# Patient Record
Sex: MALE | Race: WHITE | NOT HISPANIC OR LATINO | Employment: OTHER | URBAN - METROPOLITAN AREA
[De-identification: names, ages, dates, MRNs, and addresses within clinical notes are randomized per-mention and may not be internally consistent; named-entity substitution may affect disease eponyms.]

---

## 2017-01-05 ENCOUNTER — LAB (OUTPATIENT)
Dept: LAB | Facility: HOSPITAL | Age: 62
End: 2017-01-05

## 2017-01-05 ENCOUNTER — APPOINTMENT (OUTPATIENT)
Dept: ONCOLOGY | Facility: HOSPITAL | Age: 62
End: 2017-01-05

## 2017-01-05 DIAGNOSIS — D75.1 POLYCYTHEMIA: ICD-10-CM

## 2017-01-05 LAB
BASOPHILS # BLD AUTO: 0.16 10*3/MM3 (ref 0–0.1)
BASOPHILS NFR BLD AUTO: 1.2 % (ref 0–1.1)
DEPRECATED RDW RBC AUTO: 45.4 FL (ref 37–49)
EOSINOPHIL # BLD AUTO: 0.37 10*3/MM3 (ref 0–0.36)
EOSINOPHIL NFR BLD AUTO: 2.8 % (ref 1–5)
ERYTHROCYTE [DISTWIDTH] IN BLOOD BY AUTOMATED COUNT: 15.2 % (ref 11.7–14.5)
HCT VFR BLD AUTO: 54.1 % (ref 40–49)
HGB BLD-MCNC: 18.8 G/DL (ref 13.5–16.5)
IMM GRANULOCYTES # BLD: 0.21 10*3/MM3 (ref 0–0.03)
IMM GRANULOCYTES NFR BLD: 1.6 % (ref 0–0.5)
LYMPHOCYTES # BLD AUTO: 1.88 10*3/MM3 (ref 1–3.5)
LYMPHOCYTES NFR BLD AUTO: 14.3 % (ref 20–49)
MCH RBC QN AUTO: 30.7 PG (ref 27–33)
MCHC RBC AUTO-ENTMCNC: 34.8 G/DL (ref 32–35)
MCV RBC AUTO: 88.4 FL (ref 83–97)
MONOCYTES # BLD AUTO: 0.88 10*3/MM3 (ref 0.25–0.8)
MONOCYTES NFR BLD AUTO: 6.7 % (ref 4–12)
NEUTROPHILS # BLD AUTO: 9.64 10*3/MM3 (ref 1.5–7)
NEUTROPHILS NFR BLD AUTO: 73.4 % (ref 39–75)
NRBC BLD MANUAL-RTO: 0.2 /100 WBC (ref 0–0)
PLATELET # BLD AUTO: 612 10*3/MM3 (ref 150–375)
PMV BLD AUTO: 10.5 FL (ref 8.9–12.1)
RBC # BLD AUTO: 6.12 10*6/MM3 (ref 4.3–5.5)
WBC NRBC COR # BLD: 13.14 10*3/MM3 (ref 4–10)

## 2017-01-05 PROCEDURE — 36416 COLLJ CAPILLARY BLOOD SPEC: CPT | Performed by: INTERNAL MEDICINE

## 2017-01-05 PROCEDURE — 85025 COMPLETE CBC W/AUTO DIFF WBC: CPT | Performed by: INTERNAL MEDICINE

## 2017-01-06 ENCOUNTER — INFUSION (OUTPATIENT)
Dept: ONCOLOGY | Facility: HOSPITAL | Age: 62
End: 2017-01-06

## 2017-01-06 VITALS
SYSTOLIC BLOOD PRESSURE: 119 MMHG | DIASTOLIC BLOOD PRESSURE: 79 MMHG | HEART RATE: 89 BPM | WEIGHT: 192 LBS | TEMPERATURE: 98 F | BODY MASS INDEX: 27.49 KG/M2

## 2017-01-06 DIAGNOSIS — D75.1 POLYCYTHEMIA: Primary | ICD-10-CM

## 2017-01-06 PROCEDURE — 99195 PHLEBOTOMY: CPT

## 2017-01-06 RX ORDER — SODIUM CHLORIDE 9 MG/ML
250 INJECTION, SOLUTION INTRAVENOUS ONCE
Status: CANCELLED | OUTPATIENT
Start: 2017-01-06

## 2017-01-11 ENCOUNTER — OFFICE VISIT (OUTPATIENT)
Dept: SPORTS MEDICINE | Facility: CLINIC | Age: 62
End: 2017-01-11

## 2017-01-11 VITALS
BODY MASS INDEX: 26.92 KG/M2 | DIASTOLIC BLOOD PRESSURE: 76 MMHG | WEIGHT: 188 LBS | HEART RATE: 68 BPM | OXYGEN SATURATION: 98 % | RESPIRATION RATE: 16 BRPM | HEIGHT: 70 IN | SYSTOLIC BLOOD PRESSURE: 142 MMHG

## 2017-01-11 DIAGNOSIS — M25.512 ACUTE PAIN OF LEFT SHOULDER: Primary | ICD-10-CM

## 2017-01-11 DIAGNOSIS — D75.1 POLYCYTHEMIA: Primary | ICD-10-CM

## 2017-01-11 DIAGNOSIS — M70.22 OLECRANON BURSITIS OF LEFT ELBOW: ICD-10-CM

## 2017-01-11 PROCEDURE — 99213 OFFICE O/P EST LOW 20 MIN: CPT | Performed by: FAMILY MEDICINE

## 2017-01-11 NOTE — MR AVS SNAPSHOT
Mahendra STERLING Bock   1/11/2017 8:40 AM   Office Visit    Dept Phone:  [phone]   Encounter #:  [id number]    Provider:  Devon Caballero MD   Department:  [facility]                Your Full Care Plan              Your Updated Medication List          This list is accurate as of: 1/11/17  9:43 AM.  Always use your most recent med list.                ALPRAZolam 0.5 MG tablet   Commonly known as:  XANAX   TAKE 2 TABLETS BY MOUTH TWICE DAILY       amLODIPine 10 MG tablet   Commonly known as:  NORVASC   TAKE 1 TABLET BY MOUTH EVERY DAY       fish oil 1000 MG capsule capsule       FLUVIRIN suspension injection   Generic drug:  Influenza Vac Typ A&B Surf Ant       lisinopril 40 MG tablet   Commonly known as:  PRINIVIL,ZESTRIL   TAKE 1 TABLET BY MOUTH EVERY DAY       MULTIVITAMINS PO       PREVNAR 13 vaccine   Generic drug:  pneumococcal conj. 13-valent       temazepam 30 MG capsule   Commonly known as:  RESTORIL   TAKE 1 CAPSULE BY MOUTH EVERY DAY AT BEDTIME AS NEEDED FOR SLEEP               You Were Diagnosed With        Codes Comments    Acute pain of left shoulder    -  Primary ICD-10-CM: M25.512  ICD-9-CM: 719.41     Olecranon bursitis of left elbow     ICD-10-CM: M70.22  ICD-9-CM: 726.33       Instructions     None    Patient Instructions History      Upcoming Appointments     Visit Type Date Time Department    ESTABLISHED PT - SPORTS MED 1/11/2017  8:40 AM [facility]OR[facility]    FOLLOW UP 1 UNIT 1/12/2017  4:40 PM [facility] ONC CBC [facility]    LAB 1/12/2017  4:00 PM  [facility] CBC LAB [facility]      MyChart Signup     Our records indicate that you have declined [facility] MyChart signup. If you would like to sign up for Allegory Lawhart, please email [email or web address] or call [phone] to obtain an activation code.             Other Info from Your Visit           Your Appointments     Jan 12, 2017  4:00 PM EST   Lab with LAB CHAIR 2 KENN MCCOY    "  [facility] ONC LAB (--)    [address]  [address]  [facility] [id number] [phone]            Jan 12, 2017  4:40 PM EST   FOLLOW UP with Lalitha Simmons MD   [facility] IN BLOOD DISORDERS AND CANCER ([facility])    [address]  [address]  [place] [address] [phone]              Allergies     No Known Allergies      Reason for Visit     Left Elbow - Follow-up Would like elbow to be drained.    Left Shoulder - Pain Hs been painful since 11/17/16.       Vital Signs     Blood Pressure Pulse Respirations Height Weight Oxygen Saturation    142/76 68 16 70\" (177.8 cm) 188 lb (85.3 kg) 98%    Body Mass Index Smoking Status                26.98 kg/m2 Current Every Day Smoker          Problems and Diagnoses Noted     Acute pain of left shoulder    -  Primary    Olecranon bursitis of left elbow            "

## 2017-01-11 NOTE — PROGRESS NOTES
"Mahendra is a 61 y.o. year old male    Chief Complaint   Patient presents with   • Left Elbow - Follow-up     Would like elbow to be drained.   • Left Shoulder - Pain     Hs been painful since 11/17/16.        History of Present Illness  1. Elbow still giving issues. Draining some fluid now on and off.  2. L shoulder pain since his injury in November. Initially thought it was just bruised, but although it is slowly improving the pain has not fully resolved. Aching pain, mild-moderate severity but worse with activity, deep in the joint. No weakness.     I have reviewed the patient's medical history in detail and updated the computerized patient record.    Review of Systems   Constitutional: Negative.    Musculoskeletal: Positive for arthralgias.   Neurological: Negative.        Visit Vitals   • /76   • Pulse 68   • Resp 16   • Ht 70\" (177.8 cm)   • Wt 188 lb (85.3 kg)   • SpO2 98%   • BMI 26.98 kg/m2        Physical Exam    Vital signs reviewed.   General: No acute distress.  Eyes: conjunctiva clear; pupils equally round and reactive  ENT: external ears and nose atraumatic; oropharynx clear  CV: no peripheral edema, 2+ distal pulses  Resp: normal respiratory effort, no use of accessory muscles  Skin: no rashes or wounds; normal turgor  Psych: mood and affect appropriate; recent and remote memory intact  Neuro: sensation to light touch intact    MSK Exam:  L elbow - large thickened olecranon bursa with nodularity within    L shoulder: Normal appearance. No ttp. Normal ROM. Positive Saleem. Equivocal neer and empty can. Normal rotator cuff strength. Neg dee and yergason. No a/p laxity.     Diagnoses and all orders for this visit:    Acute pain of left shoulder    Olecranon bursitis of left elbow  -     Ambulatory Referral to Hand Surgery  Shoulder pain is hopefully just some deep joint contusion, could possibly be a labral tear. Discussed nsaids and gentle strengthening. If not improving gradually will pursue " imaging.

## 2017-01-12 ENCOUNTER — LAB (OUTPATIENT)
Dept: ONCOLOGY | Facility: HOSPITAL | Age: 62
End: 2017-01-12

## 2017-01-12 ENCOUNTER — OFFICE VISIT (OUTPATIENT)
Dept: ONCOLOGY | Facility: CLINIC | Age: 62
End: 2017-01-12

## 2017-01-12 VITALS
BODY MASS INDEX: 26.97 KG/M2 | TEMPERATURE: 98 F | HEART RATE: 88 BPM | SYSTOLIC BLOOD PRESSURE: 116 MMHG | HEIGHT: 70 IN | DIASTOLIC BLOOD PRESSURE: 72 MMHG | WEIGHT: 188.4 LBS | RESPIRATION RATE: 16 BRPM | OXYGEN SATURATION: 96 %

## 2017-01-12 DIAGNOSIS — D45 POLYCYTHEMIA VERA (HCC): ICD-10-CM

## 2017-01-12 DIAGNOSIS — D75.1 POLYCYTHEMIA: ICD-10-CM

## 2017-01-12 DIAGNOSIS — D45: Primary | ICD-10-CM

## 2017-01-12 LAB
BASOPHILS # BLD AUTO: 0.12 10*3/MM3 (ref 0–0.2)
BASOPHILS NFR BLD AUTO: 1 % (ref 0–1.5)
DEPRECATED RDW RBC AUTO: 46.8 FL (ref 37–54)
EOSINOPHIL # BLD AUTO: 0.24 10*3/MM3 (ref 0–0.7)
EOSINOPHIL NFR BLD AUTO: 1.9 % (ref 0.3–6.2)
ERYTHROCYTE [DISTWIDTH] IN BLOOD BY AUTOMATED COUNT: 14.4 % (ref 11.5–14.5)
HCT VFR BLD AUTO: 49.5 % (ref 40.4–52.2)
HGB BLD-MCNC: 17.3 G/DL (ref 13.7–17.6)
IMM GRANULOCYTES # BLD: 0.14 10*3/MM3 (ref 0–0.03)
IMM GRANULOCYTES NFR BLD: 1.1 % (ref 0–0.5)
LYMPHOCYTES # BLD AUTO: 1.77 10*3/MM3 (ref 0.9–4.8)
LYMPHOCYTES NFR BLD AUTO: 14.2 % (ref 19.6–45.3)
MCH RBC QN AUTO: 31.3 PG (ref 27–32.7)
MCHC RBC AUTO-ENTMCNC: 34.9 G/DL (ref 32.6–36.4)
MCV RBC AUTO: 89.7 FL (ref 79.8–96.2)
MONOCYTES # BLD AUTO: 0.76 10*3/MM3 (ref 0.2–1.2)
MONOCYTES NFR BLD AUTO: 6.1 % (ref 5–12)
NEUTROPHILS # BLD AUTO: 9.41 10*3/MM3 (ref 1.9–8.1)
NEUTROPHILS NFR BLD AUTO: 75.7 % (ref 42.7–76)
NRBC BLD MANUAL-RTO: 0.2 /100 WBC (ref 0–0)
PLATELET # BLD AUTO: 625 10*3/MM3 (ref 140–500)
PMV BLD AUTO: 10.6 FL (ref 6–12)
RBC # BLD AUTO: 5.52 10*6/MM3 (ref 4.6–6)
WBC NRBC COR # BLD: 12.44 10*3/MM3 (ref 4.5–10.7)

## 2017-01-12 PROCEDURE — 99214 OFFICE O/P EST MOD 30 MIN: CPT | Performed by: INTERNAL MEDICINE

## 2017-01-12 PROCEDURE — 36415 COLL VENOUS BLD VENIPUNCTURE: CPT

## 2017-01-12 PROCEDURE — 85025 COMPLETE CBC W/AUTO DIFF WBC: CPT | Performed by: INTERNAL MEDICINE

## 2017-01-12 RX ORDER — HYDROXYUREA 500 MG/1
500 CAPSULE ORAL 2 TIMES DAILY
Qty: 60 CAPSULE | Refills: 5 | Status: SHIPPED | OUTPATIENT
Start: 2017-01-12 | End: 2017-08-24 | Stop reason: SDUPTHER

## 2017-01-14 PROBLEM — D45 POLYCYTHEMIA VERA (HCC): Status: ACTIVE | Noted: 2017-01-14

## 2017-01-14 NOTE — PROGRESS NOTES
Subjective .   DOS: 1/12/17      REASONS FOR FOLLOWUP:  Polycythemia vera, JODI 2 positive, erythropoietin level is low 1.0.    HISTORY OF PRESENT ILLNESS:  The patient is a 61 y.o. year old male who is here for follow-up with the above-mentioned history.    History of Present Illness   Pt presented with Polycythemia with leukocytosis and intermittent thrombocytosis currently referred here for evaluation. He is a big smoker smoking 2 packs per day for many years. He also has symptoms of sleep apnea with significant snoring at bedtime and daytime sleepiness. And fatigue. Complete work up in hematological history. Pt has p. Vera s/p 2 phlebotomies. No itching, no headache or weakness.    Past Medical History   Diagnosis Date   • Arthritis    • ED (erectile dysfunction)    • Hypertension    • Pyloric stenosis    • Venereal disease        HEMATOLOGIC  HISTORY:    patient is a 61-year-old gentleman who has been followed by Dr. Devon rocha for the last 4-5 years and states that he has had elevated hematocrit for quite a while. Several years ago he was found to have high hematocrit and more recently when he went to Dr. Caballero's office his hematocrit had gone to 59. He does smoke 2 packs per day. He had started at age 18 and quit at age 39s mid-forties but because of her recent divorce he recently started back on smoking since last year. He also drinks heavily and he has had heavy alcohol use for quite some time. He says he takes 2-3 beers a day along with 2-3 cocktails daily. He states that he has got chronic epigastric and right upper quadrant pain that he is concerned about as it doesn't seem to have improved it has gotten a little worse lately in the with pain in the right upper quadrant and epigastrium. He has not lost weight.     He stays a month ago he fell and got hurt by the Ondina on his back and his left shoulder. So he has a lot of pain in the shoulder as well as his back. He did go to the emergency room the  day after at which time x-rays have been done and all of the x-rays were negative for any fractures.     He states he does not have any headaches, he does have significant snoring and daytime fatigue and sleepiness but has not been so far evaluated for sleep apnea.    JAK2 positive, MPL, CALR, BCR/CABL negative. Flow negative. Erythropoietin level 1.0.   CT  c/abd/pelvis negative.  Hydrea started 17    Current Outpatient Prescriptions on File Prior to Visit   Medication Sig Dispense Refill   • ALPRAZolam (XANAX) 0.5 MG tablet TAKE 2 TABLETS BY MOUTH TWICE DAILY 60 tablet 0   • amLODIPine (NORVASC) 10 MG tablet TAKE 1 TABLET BY MOUTH EVERY DAY 90 tablet 1   • FLUVIRIN suspension injection ADM 0.5ML IM UTD  0   • lisinopril (PRINIVIL,ZESTRIL) 40 MG tablet TAKE 1 TABLET BY MOUTH EVERY DAY 30 tablet 2   • Multiple Vitamin (MULTIVITAMINS PO) Take  by mouth.     • Omega-3 Fatty Acids (FISH OIL) 1000 MG capsule capsule Take  by mouth Daily With Breakfast.     • PREVNAR 13 vaccine ADM 0.5ML IM UTD  0   • temazepam (RESTORIL) 30 MG capsule TAKE 1 CAPSULE BY MOUTH EVERY DAY AT BEDTIME AS NEEDED FOR SLEEP 90 capsule 0     No current facility-administered medications on file prior to visit.        ALLERGIES:   No Known Allergies     Social history he is been  for the fourth time. He is a self-employed . He smokes 2 packs per day, is to smoke many years until age 39 and now more recently he restarted back a 1 year ago because of the recent divorce. He drinks heavily as well.      Family history his mother had breast cancer at age 60 but she is alive at age 84. His father  at 84 with car accident. He has 2 brothers and 2 sisters that are all in good health. He has got 4 kids [phone] and 23 Katy who are all in good health.            Cancer-related family history includes Breast cancer in his mother.     Review of Systems  A comprehensive 14 point review of systems was performed and was negative except  "as mentioned.    Objective      Vitals:    01/12/17 1642   BP: 116/72   Pulse: 88   Resp: 16   Temp: 98 °F (36.7 °C)   TempSrc: Oral   SpO2: 96%   Weight: 188 lb 6.4 oz (85.5 kg)   Height: 70\" (177.8 cm)   PainSc: 0-No pain     Current Status 1/12/2017   ECOG score 0       Physical Exam    GENERAL:  Well-developed, well-nourished in no acute distress.   SKIN:  Warm, dry without rashes, purpura or petechiae.  EYES:  Pupils equal, round and reactive to light.  EOMs intact.  Conjunctivae normal.  EARS:  Hearing intact.  NOSE:  Septum midline.  No excoriations or nasal discharge.  MOUTH:  Tongue is well-papillated; no stomatitis or ulcers.  Lips normal.  THROAT:  Oropharynx without lesions or exudates.  NECK:  Supple with good range of motion; no thyromegaly or masses, no JVD.  LYMPHATICS:  No cervical, supraclavicular, axillary or inguinal adenopathy.  CHEST:  Lungs clear to auscultation. Good airflow.  CARDIAC:  Regular rate and rhythm without murmurs, rubs or gallops. Normal S1,S2.  ABDOMEN:  Soft, nontender with no hepatosplenomegaly or masses.  EXTREMITIES:  No clubbing, cyanosis or edema.  NEUROLOGICAL:  Cranial Nerves II-XII grossly intact.  No focal neurological deficits.  PSYCHIATRIC:  Normal affect and mood.        RECENT LABS:  Hematogy WBC   Date Value Ref Range Status   01/12/2017 12.44 (H) 4.50 - 10.70 10*3/mm3 Final   12/01/2016 12.34 (H) 4.50 - 10.70 10*3/mm3 Final     RBC   Date Value Ref Range Status   01/12/2017 5.52 4.60 - 6.00 10*6/mm3 Final   12/01/2016 6.32 (H) 4.60 - 6.00 10*6/mm3 Final     HEMOGLOBIN   Date Value Ref Range Status   01/12/2017 17.3 13.7 - 17.6 g/dL Final   12/01/2016 19.6 (H) 13.7 - 17.6 g/dL Final     HEMATOCRIT   Date Value Ref Range Status   01/12/2017 49.5 40.4 - 52.2 % Final   12/01/2016 60.1 (H) 40.4 - 52.2 % Final     PLATELETS   Date Value Ref Range Status   01/12/2017 625 (H) 140 - 500 10*3/mm3 Final   12/01/2016 370 140 - 500 10*3/mm3 Final        Assessment/Plan     1. " Polycythemia vera, JODI 2 positive, with decreased erythropoietn and splenomegaly. S/p phlebotomy x 2 with hg decresed  to 17.3.will start hydroxyurea. Explained all side effects of drug and he is willing to start. Will start aspirin and hydrea 500 mgs po bid.  2. Etoh abuse  3, h/o smoking     Plan:    1. Weekly cbc with nurse review    2. np in 2 weeks, add MDin 4 weeks.     3. Waldo ambrocio in 8 weeks    4. Hydrea 500 mg po bid    Lalitha Ambrocio MD              Cc:  Devon Caballero MD

## 2017-01-18 ENCOUNTER — TELEPHONE (OUTPATIENT)
Dept: SPORTS MEDICINE | Facility: CLINIC | Age: 62
End: 2017-01-18

## 2017-01-18 DIAGNOSIS — D45 POLYCYTHEMIA VERA (HCC): Primary | ICD-10-CM

## 2017-01-18 NOTE — TELEPHONE ENCOUNTER
Please change his appt to be with Dr. Weber instead of [facility] and [facility] if possible (I heard that [facility] doesn't take his insurance). Ok to change to ortho referral instead of hand.

## 2017-01-19 ENCOUNTER — APPOINTMENT (OUTPATIENT)
Dept: ONCOLOGY | Facility: CLINIC | Age: 62
End: 2017-01-19

## 2017-01-19 ENCOUNTER — APPOINTMENT (OUTPATIENT)
Dept: ONCOLOGY | Facility: HOSPITAL | Age: 62
End: 2017-01-19

## 2017-01-19 DIAGNOSIS — M25.522 LEFT ELBOW PAIN: Primary | ICD-10-CM

## 2017-01-20 ENCOUNTER — INFUSION (OUTPATIENT)
Dept: ONCOLOGY | Facility: HOSPITAL | Age: 62
End: 2017-01-20

## 2017-01-20 ENCOUNTER — OFFICE VISIT (OUTPATIENT)
Dept: ONCOLOGY | Facility: CLINIC | Age: 62
End: 2017-01-20

## 2017-01-20 ENCOUNTER — LAB (OUTPATIENT)
Dept: ONCOLOGY | Facility: HOSPITAL | Age: 62
End: 2017-01-20

## 2017-01-20 VITALS
OXYGEN SATURATION: 96 % | WEIGHT: 188.4 LBS | HEIGHT: 70 IN | HEART RATE: 73 BPM | RESPIRATION RATE: 16 BRPM | DIASTOLIC BLOOD PRESSURE: 69 MMHG | TEMPERATURE: 97.7 F | SYSTOLIC BLOOD PRESSURE: 104 MMHG | BODY MASS INDEX: 26.97 KG/M2

## 2017-01-20 DIAGNOSIS — D45 POLYCYTHEMIA VERA (HCC): Primary | ICD-10-CM

## 2017-01-20 DIAGNOSIS — D45 POLYCYTHEMIA VERA (HCC): ICD-10-CM

## 2017-01-20 LAB
BASOPHILS # BLD AUTO: 0.12 10*3/MM3 (ref 0–0.2)
BASOPHILS NFR BLD AUTO: 1.1 % (ref 0–1.5)
DEPRECATED RDW RBC AUTO: 48 FL (ref 37–54)
EOSINOPHIL # BLD AUTO: 0.26 10*3/MM3 (ref 0–0.7)
EOSINOPHIL NFR BLD AUTO: 2.4 % (ref 0.3–6.2)
ERYTHROCYTE [DISTWIDTH] IN BLOOD BY AUTOMATED COUNT: 14.6 % (ref 11.5–14.5)
HCT VFR BLD AUTO: 50.3 % (ref 40.4–52.2)
HGB BLD-MCNC: 17.6 G/DL (ref 13.7–17.6)
IMM GRANULOCYTES # BLD: 0.13 10*3/MM3 (ref 0–0.03)
IMM GRANULOCYTES NFR BLD: 1.2 % (ref 0–0.5)
LYMPHOCYTES # BLD AUTO: 1.69 10*3/MM3 (ref 0.9–4.8)
LYMPHOCYTES NFR BLD AUTO: 15.5 % (ref 19.6–45.3)
MCH RBC QN AUTO: 31.8 PG (ref 27–32.7)
MCHC RBC AUTO-ENTMCNC: 35 G/DL (ref 32.6–36.4)
MCV RBC AUTO: 91 FL (ref 79.8–96.2)
MONOCYTES # BLD AUTO: 0.65 10*3/MM3 (ref 0.2–1.2)
MONOCYTES NFR BLD AUTO: 6 % (ref 5–12)
NEUTROPHILS # BLD AUTO: 8.02 10*3/MM3 (ref 1.9–8.1)
NEUTROPHILS NFR BLD AUTO: 73.8 % (ref 42.7–76)
NRBC BLD MANUAL-RTO: 0 /100 WBC (ref 0–0)
PLATELET # BLD AUTO: 485 10*3/MM3 (ref 140–500)
PMV BLD AUTO: 10.8 FL (ref 6–12)
RBC # BLD AUTO: 5.53 10*6/MM3 (ref 4.6–6)
WBC NRBC COR # BLD: 10.87 10*3/MM3 (ref 4.5–10.7)

## 2017-01-20 PROCEDURE — 99213 OFFICE O/P EST LOW 20 MIN: CPT | Performed by: NURSE PRACTITIONER

## 2017-01-20 PROCEDURE — 99195 PHLEBOTOMY: CPT

## 2017-01-20 PROCEDURE — 85025 COMPLETE CBC W/AUTO DIFF WBC: CPT | Performed by: INTERNAL MEDICINE

## 2017-01-20 PROCEDURE — 36415 COLL VENOUS BLD VENIPUNCTURE: CPT

## 2017-01-20 RX ORDER — SODIUM CHLORIDE 9 MG/ML
250 INJECTION, SOLUTION INTRAVENOUS ONCE
Status: CANCELLED | OUTPATIENT
Start: 2017-01-20

## 2017-01-20 NOTE — PROGRESS NOTES
Subjective .   REASONS FOR FOLLOWUP:  Polycythemia vera, JODI 2 positive, erythropoietin level is low 1.0.    HISTORY OF PRESENT ILLNESS:  The patient is a 61 y.o. year old male who is here for follow-up with the above-mentioned history.    History of Present Illness      The patient is a pleasant 61-year-old gentleman with the above-mentioned history, who returns today for lab review and toxicity check having initiated Hydrea 500 mg twice a day, 1 week ago.  Thankfully, the patient's thrombocytosis is improved.  Unfortunately, his tolerance to Hydrea has been less than ideal.  He reports significant increase in fatigue.  He also reports feeling of agitation and disorientation in the morning after taking his morning  Hydrea.  He is able to tolerate his nighttime dose, as he sleeps through the majority of side effects.  He denies any headaches or blurred vision.  He denies any lower extremity edema.  The patient is questioning discontinuing Hydrea altogether, and proceeding with phlebotomy alone.  He is pleased to know his counts have improved after only one week of Hydrea.    Past Medical History   Diagnosis Date   • Arthritis    • ED (erectile dysfunction)    • Hypertension    • Pyloric stenosis    • Venereal disease      Past Surgical History   Procedure Laterality Date   • Colonoscopy     • Foot arthroplasty Right 1990   • Rotator cuff repair Left    • Sinus surgery         HEMATOLOGIC  HISTORY:    patient is a 61-year-old gentleman who has been followed by Dr. Devon rocha for the last 4-5 years and states that he has had elevated hematocrit for quite a while. Several years ago he was found to have high hematocrit and more recently when he went to Dr. Caballero's office his hematocrit had gone to 59. He does smoke 2 packs per day. He had started at age 18 and quit at age 39s mid-forties but because of her recent divorce he recently started back on smoking since last year. He also drinks heavily and he has had  heavy alcohol use for quite some time. He says he takes 2-3 beers a day along with 2-3 cocktails daily. He states that he has got chronic epigastric and right upper quadrant pain that he is concerned about as it doesn't seem to have improved it has gotten a little worse lately in the with pain in the right upper quadrant and epigastrium. He has not lost weight.     He stays a month ago he fell and got hurt by the Ondina on his back and his left shoulder. So he has a lot of pain in the shoulder as well as his back. He did go to the emergency room the day after at which time x-rays have been done and all of the x-rays were negative for any fractures.     He states he does not have any headaches, he does have significant snoring and daytime fatigue and sleepiness but has not been so far evaluated for sleep apnea.    JAK2 positive, MPL, CALR, BCR/CABL negative. Flow negative. Erythropoietin level 1.0.   CT  c/abd/pelvis negative.  Hydrea started 17    MEDICATIONS: The current medication list was reviewed in the EMR.    ALLERGIES:   No Known Allergies     Social history he is been  for the fourth time. He is a self-employed . He smokes 2 packs per day, is to smoke many years until age 39 and now more recently he restarted back a 1 year ago because of the recent divorce. He drinks heavily as well.      Family history his mother had breast cancer at age 60 but she is alive at age 84. His father  at 84 with car accident. He has 2 brothers and 2 sisters that are all in good health. He has got 4 kids [phone] and 23 Katy who are all in good health.        Cancer-related family history includes Breast cancer in his mother.     I have reviewed the patient's medical history in detail and updated the computerized patient record.    Review of Systems   Constitutional: Positive for fatigue. Negative for chills and fever.   HENT: Negative for mouth sores and sore throat.    Eyes: Negative for visual  "disturbance.   Respiratory: Negative for cough and shortness of breath.    Gastrointestinal: Negative for abdominal pain, diarrhea, nausea and vomiting.   Genitourinary: Negative for dysuria and frequency.   Skin: Positive for wound (left elbow.). Negative for pallor and rash.   Neurological: Negative for dizziness and weakness.   Hematological: Does not bruise/bleed easily.   Psychiatric/Behavioral: Positive for agitation. The patient is not nervous/anxious.    All other systems reviewed and are negative.    Objective      Vitals:    01/20/17 0953   BP: 104/69   Pulse: 73   Resp: 16   Temp: 97.7 °F (36.5 °C)   SpO2: 96%   Weight: 188 lb 6.4 oz (85.5 kg)   Height: 70.08\" (178 cm)   PainSc: 0-No pain     Current Status 1/20/2017   ECOG score 0       Physical Exam    GENERAL:  Well-developed, well-nourished in no acute distress.   SKIN:  Warm, dry without rashes, purpura or petechiae.  EYES:  Pupils equal, round and reactive to light.  EOMs intact.  Conjunctivae normal.  EARS:  Hearing intact.  NOSE:  Septum midline.  No excoriations or nasal discharge.  CHEST:  Lungs clear to auscultation. Good airflow.  CARDIAC:  Regular rate and rhythm without murmurs, rubs or gallops. Normal S1,S2.  ABDOMEN:  Soft, nontender with no hepatosplenomegaly or masses.  EXTREMITIES:  No clubbing, cyanosis or edema.  Edema, with serous drainage of the left elbow related to foreign object.  NEUROLOGICAL:  Cranial Nerves II-XII grossly intact.  No focal neurological deficits.   PSYCHIATRIC:  Normal affect and mood.        RECENT LABS:  Lab Results   Component Value Date    WBC 10.87 (H) 01/20/2017    HGB 17.6 01/20/2017    HCT 50.3 01/20/2017    MCV 91.0 01/20/2017     01/20/2017       Assessment/Plan   1. Polycythemia vera, JODI 2 positive, with decreased erythropoietn and splenomegaly. S/p phlebotomy x 2 with decreased hemoglobin. Initiation of Hydrea 500 mg twice a day 1/13/2017.    Decreased to 500 mg daily due to poor tolerance.  " Continue to phlebotomize for hematocrit greater than 45%.  2. Etoh abuse  3. h/o smoking     Plan:  1. Decrease Hydrea to 500mg daily.  2.  Proceed with phlebotomy today, 500 mL's.  The patient will continue to receive phlebotomy for hematocrit greater than 45%.  3.  Return weekly for CBC with nurse review  4.  Follow-up with Dr. Simmons 3/16/2017.    The plan to decrease Hydrea to 500 mg daily given the patient's tolerance, and phlebotomize for hematocrit greater than 45% was discussed and reviewed with Dr. Simmons who is in agreement.     Joanne Bray, APRN  01/20/2017      Cc:  Devon Caballero MD

## 2017-01-26 ENCOUNTER — INFUSION (OUTPATIENT)
Dept: ONCOLOGY | Facility: HOSPITAL | Age: 62
End: 2017-01-26

## 2017-01-26 ENCOUNTER — OFFICE VISIT (OUTPATIENT)
Dept: ONCOLOGY | Facility: CLINIC | Age: 62
End: 2017-01-26

## 2017-01-26 ENCOUNTER — TELEPHONE (OUTPATIENT)
Dept: SPORTS MEDICINE | Facility: CLINIC | Age: 62
End: 2017-01-26

## 2017-01-26 ENCOUNTER — LAB (OUTPATIENT)
Dept: ONCOLOGY | Facility: HOSPITAL | Age: 62
End: 2017-01-26

## 2017-01-26 VITALS
SYSTOLIC BLOOD PRESSURE: 119 MMHG | OXYGEN SATURATION: 98 % | TEMPERATURE: 97.6 F | DIASTOLIC BLOOD PRESSURE: 69 MMHG | HEART RATE: 72 BPM

## 2017-01-26 DIAGNOSIS — D45 POLYCYTHEMIA VERA (HCC): ICD-10-CM

## 2017-01-26 DIAGNOSIS — D45: ICD-10-CM

## 2017-01-26 DIAGNOSIS — F06.4 ANXIETY DISORDER DUE TO MEDICAL CONDITION: Primary | ICD-10-CM

## 2017-01-26 LAB
BASOPHILS # BLD AUTO: 0.12 10*3/MM3 (ref 0–0.2)
BASOPHILS NFR BLD AUTO: 1.2 % (ref 0–1.5)
DEPRECATED RDW RBC AUTO: 51.8 FL (ref 37–54)
EOSINOPHIL # BLD AUTO: 0.27 10*3/MM3 (ref 0–0.7)
EOSINOPHIL NFR BLD AUTO: 2.8 % (ref 0.3–6.2)
ERYTHROCYTE [DISTWIDTH] IN BLOOD BY AUTOMATED COUNT: 16.1 % (ref 11.5–14.5)
HCT VFR BLD AUTO: 49.7 % (ref 40.4–52.2)
HGB BLD-MCNC: 17.1 G/DL (ref 13.7–17.6)
IMM GRANULOCYTES # BLD: 0.16 10*3/MM3 (ref 0–0.03)
IMM GRANULOCYTES NFR BLD: 1.7 % (ref 0–0.5)
LYMPHOCYTES # BLD AUTO: 1.57 10*3/MM3 (ref 0.9–4.8)
LYMPHOCYTES NFR BLD AUTO: 16.3 % (ref 19.6–45.3)
MCH RBC QN AUTO: 31.8 PG (ref 27–32.7)
MCHC RBC AUTO-ENTMCNC: 34.4 G/DL (ref 32.6–36.4)
MCV RBC AUTO: 92.4 FL (ref 79.8–96.2)
MONOCYTES # BLD AUTO: 0.57 10*3/MM3 (ref 0.2–1.2)
MONOCYTES NFR BLD AUTO: 5.9 % (ref 5–12)
NEUTROPHILS # BLD AUTO: 6.94 10*3/MM3 (ref 1.9–8.1)
NEUTROPHILS NFR BLD AUTO: 72.1 % (ref 42.7–76)
NRBC BLD MANUAL-RTO: 0 /100 WBC (ref 0–0)
PLAT MORPH BLD: NORMAL
PLATELET # BLD AUTO: 378 10*3/MM3 (ref 140–500)
PMV BLD AUTO: 11.5 FL (ref 6–12)
RBC # BLD AUTO: 5.38 10*6/MM3 (ref 4.6–6)
RBC MORPH BLD: NORMAL
WBC MORPH BLD: NORMAL
WBC NRBC COR # BLD: 9.63 10*3/MM3 (ref 4.5–10.7)

## 2017-01-26 PROCEDURE — 85007 BL SMEAR W/DIFF WBC COUNT: CPT | Performed by: INTERNAL MEDICINE

## 2017-01-26 PROCEDURE — 85025 COMPLETE CBC W/AUTO DIFF WBC: CPT | Performed by: INTERNAL MEDICINE

## 2017-01-26 PROCEDURE — 99195 PHLEBOTOMY: CPT

## 2017-01-26 PROCEDURE — 36415 COLL VENOUS BLD VENIPUNCTURE: CPT

## 2017-01-26 PROCEDURE — 99212-NC PR NO CHARGE CBC OFFICE OUTPATIENT VISIT 10 MINUTES: Performed by: NURSE PRACTITIONER

## 2017-01-26 NOTE — PROGRESS NOTES
Reviewed labs with patient today. His platelets have improved. His HCT is 49 which qualifies him for phlebotomy today. His current parameters are phlebotomy for HCT above 45. He will continue Hydrea 500mg once daily. He is requesting a consult to Dr. Cristobal today as he is having difficulty processing his new diagnosis. I will order a referral today. He will return as already scheduled for lab work and possible phlebotomy next week.

## 2017-01-30 ENCOUNTER — DOCUMENTATION (OUTPATIENT)
Dept: ONCOLOGY | Facility: CLINIC | Age: 62
End: 2017-01-30

## 2017-01-30 RX ORDER — TEMAZEPAM 30 MG/1
CAPSULE ORAL
Qty: 90 CAPSULE | Refills: 0 | OUTPATIENT
Start: 2017-01-30

## 2017-01-30 NOTE — PROGRESS NOTES
Since pt is seen only at [facility], ISAI sent an email to Amaya Welch, suggesting she follow up with him. This is in response to a notice from Marni Oakley that Dr. Cristobal is only seeing pts who have been diagnosed with cancer.

## 2017-01-31 ENCOUNTER — DOCUMENTATION (OUTPATIENT)
Dept: ONCOLOGY | Facility: HOSPITAL | Age: 62
End: 2017-01-31

## 2017-01-31 DIAGNOSIS — D45 POLYCYTHEMIA VERA (HCC): Primary | ICD-10-CM

## 2017-01-31 NOTE — PROGRESS NOTES
"OSW was requested by Tootie Burk LCSW, to contact the pt to facilitate a mental health referral. OSW initiated a call to the pt who was at work. 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 The pt stated that he is aware that he \"needs to quit smoking and drinking\" due to his recent diagnosis of polycythemia vera. He states that he takes xanax in the morning to address his anxiety; however, he noted that he feels his coping skills are \"not good\".    The pt is currently  from his wife who lives locally. The pt relocated to [place] from [place] seven years ago. [facility] OSW reviewed with the pt his options for mental health care which are [facility] and [facility], and both require therapy visits prior to being scheduled with a qualified individual for a medication evaluation. The pt stated that he has seen a therapist in the past (who he felt was helpful), Jass Gamble, a psychologist, and was paying $120 out of pocket. The pt voiced interest in using his mental healthcare benefit and asked OSW to call [facility] on his behalf to refer him. OSW agreed and made the referral to Bushra at [phone]. Bushra stated that the pt would be contacted within 24-48 hours to arrange an intake appointment. Further, Bushra stated that there is a 2 1/2 month wait to see a provider who can prescribe. OSW called the pt to inform him of this plan; he voiced understanding.    OSW will send the pt information on smoking cessation via [employer] Mail.    Amaya Welch LCSW  Oncology Social Worker   "

## 2017-02-02 ENCOUNTER — DOCUMENTATION (OUTPATIENT)
Dept: ONCOLOGY | Facility: CLINIC | Age: 62
End: 2017-02-02

## 2017-02-02 ENCOUNTER — OFFICE VISIT (OUTPATIENT)
Dept: ONCOLOGY | Facility: CLINIC | Age: 62
End: 2017-02-02

## 2017-02-02 ENCOUNTER — LAB (OUTPATIENT)
Dept: ONCOLOGY | Facility: HOSPITAL | Age: 62
End: 2017-02-02

## 2017-02-02 VITALS
SYSTOLIC BLOOD PRESSURE: 113 MMHG | OXYGEN SATURATION: 99 % | HEART RATE: 82 BPM | TEMPERATURE: 97.9 F | DIASTOLIC BLOOD PRESSURE: 76 MMHG

## 2017-02-02 DIAGNOSIS — D45 POLYCYTHEMIA VERA (HCC): ICD-10-CM

## 2017-02-02 DIAGNOSIS — D45 POLYCYTHEMIA VERA (HCC): Primary | ICD-10-CM

## 2017-02-02 LAB
BASOPHILS # BLD AUTO: 0.1 10*3/MM3 (ref 0–0.2)
BASOPHILS NFR BLD AUTO: 1.3 % (ref 0–1.5)
DEPRECATED RDW RBC AUTO: 57.2 FL (ref 37–54)
EOSINOPHIL # BLD AUTO: 0.25 10*3/MM3 (ref 0–0.7)
EOSINOPHIL NFR BLD AUTO: 3.3 % (ref 0.3–6.2)
ERYTHROCYTE [DISTWIDTH] IN BLOOD BY AUTOMATED COUNT: 16.7 % (ref 11.5–14.5)
HCT VFR BLD AUTO: 48.5 % (ref 40.4–52.2)
HGB BLD-MCNC: 16.3 G/DL (ref 13.7–17.6)
IMM GRANULOCYTES # BLD: 0.06 10*3/MM3 (ref 0–0.03)
IMM GRANULOCYTES NFR BLD: 0.8 % (ref 0–0.5)
LYMPHOCYTES # BLD AUTO: 1.13 10*3/MM3 (ref 0.9–4.8)
LYMPHOCYTES NFR BLD AUTO: 14.8 % (ref 19.6–45.3)
MCH RBC QN AUTO: 32.1 PG (ref 27–32.7)
MCHC RBC AUTO-ENTMCNC: 33.6 G/DL (ref 32.6–36.4)
MCV RBC AUTO: 95.5 FL (ref 79.8–96.2)
MONOCYTES # BLD AUTO: 0.47 10*3/MM3 (ref 0.2–1.2)
MONOCYTES NFR BLD AUTO: 6.2 % (ref 5–12)
NEUTROPHILS # BLD AUTO: 5.6 10*3/MM3 (ref 1.9–8.1)
NEUTROPHILS NFR BLD AUTO: 73.6 % (ref 42.7–76)
NRBC BLD MANUAL-RTO: 0 /100 WBC (ref 0–0)
PLATELET # BLD AUTO: 437 10*3/MM3 (ref 140–500)
PMV BLD AUTO: 9.7 FL (ref 6–12)
RBC # BLD AUTO: 5.08 10*6/MM3 (ref 4.6–6)
WBC NRBC COR # BLD: 7.61 10*3/MM3 (ref 4.5–10.7)

## 2017-02-02 PROCEDURE — 85025 COMPLETE CBC W/AUTO DIFF WBC: CPT | Performed by: NURSE PRACTITIONER

## 2017-02-02 PROCEDURE — 36415 COLL VENOUS BLD VENIPUNCTURE: CPT

## 2017-02-02 PROCEDURE — 99212-NC PR NO CHARGE CBC OFFICE OUTPATIENT VISIT 10 MINUTES: Performed by: NURSE PRACTITIONER

## 2017-02-02 NOTE — PROGRESS NOTES
OSW received a phone call from Ellie Brunner, CADC, [facility], with [facility]. She stated that she has an appointment for an intake evaluation with the pt on Monday, 2/6/17.    Amaya Welch [facility]  Oncology Social Worker

## 2017-02-02 NOTE — PROGRESS NOTES
I reviewed labs with patient today. His platelets are slightly elevated, rising from 378 to 437,000 today. He continues on Hydrea 500mg once daily. He feels that he has acclimated well to the drug and is willing to try BID dosing every other day. His HCT is 48.5 today. His phlebotomy threshold has been between 45% and 49% depending on his Hydrea dose. We will forgo phlebotomy today at his request (he has required phlebotomy three consecutive weeks) and considering our dose increase. He will return as scheduled next week to see Dr. Rivera with possible phlebotomy. He has no new concerns today and reports feeling well.

## 2017-02-09 ENCOUNTER — LAB (OUTPATIENT)
Dept: ONCOLOGY | Facility: HOSPITAL | Age: 62
End: 2017-02-09

## 2017-02-09 ENCOUNTER — INFUSION (OUTPATIENT)
Dept: ONCOLOGY | Facility: HOSPITAL | Age: 62
End: 2017-02-09

## 2017-02-09 ENCOUNTER — TELEPHONE (OUTPATIENT)
Dept: SPORTS MEDICINE | Facility: CLINIC | Age: 62
End: 2017-02-09

## 2017-02-09 ENCOUNTER — OFFICE VISIT (OUTPATIENT)
Dept: ONCOLOGY | Facility: CLINIC | Age: 62
End: 2017-02-09

## 2017-02-09 VITALS
HEIGHT: 70 IN | HEART RATE: 74 BPM | SYSTOLIC BLOOD PRESSURE: 145 MMHG | DIASTOLIC BLOOD PRESSURE: 94 MMHG | WEIGHT: 192 LBS | BODY MASS INDEX: 27.49 KG/M2 | OXYGEN SATURATION: 100 % | TEMPERATURE: 97.5 F | RESPIRATION RATE: 12 BRPM

## 2017-02-09 DIAGNOSIS — D45 POLYCYTHEMIA VERA (HCC): Primary | ICD-10-CM

## 2017-02-09 LAB
BASOPHILS # BLD AUTO: 0.11 10*3/MM3 (ref 0–0.2)
BASOPHILS NFR BLD AUTO: 1.4 % (ref 0–1.5)
DEPRECATED RDW RBC AUTO: 55.9 FL (ref 37–54)
EOSINOPHIL # BLD AUTO: 0.26 10*3/MM3 (ref 0–0.7)
EOSINOPHIL NFR BLD AUTO: 3.3 % (ref 0.3–6.2)
ERYTHROCYTE [DISTWIDTH] IN BLOOD BY AUTOMATED COUNT: 17.1 % (ref 11.5–14.5)
HCT VFR BLD AUTO: 49.3 % (ref 40.4–52.2)
HGB BLD-MCNC: 17.1 G/DL (ref 13.7–17.6)
IMM GRANULOCYTES # BLD: 0.1 10*3/MM3 (ref 0–0.03)
IMM GRANULOCYTES NFR BLD: 1.3 % (ref 0–0.5)
LYMPHOCYTES # BLD AUTO: 1.5 10*3/MM3 (ref 0.9–4.8)
LYMPHOCYTES NFR BLD AUTO: 19.2 % (ref 19.6–45.3)
MCH RBC QN AUTO: 32.1 PG (ref 27–32.7)
MCHC RBC AUTO-ENTMCNC: 34.7 G/DL (ref 32.6–36.4)
MCV RBC AUTO: 92.5 FL (ref 79.8–96.2)
MONOCYTES # BLD AUTO: 0.68 10*3/MM3 (ref 0.2–1.2)
MONOCYTES NFR BLD AUTO: 8.7 % (ref 5–12)
NEUTROPHILS # BLD AUTO: 5.16 10*3/MM3 (ref 1.9–8.1)
NEUTROPHILS NFR BLD AUTO: 66.1 % (ref 42.7–76)
NRBC BLD MANUAL-RTO: 0 /100 WBC (ref 0–0)
PLATELET # BLD AUTO: 381 10*3/MM3 (ref 140–500)
PMV BLD AUTO: 10.9 FL (ref 6–12)
RBC # BLD AUTO: 5.33 10*6/MM3 (ref 4.6–6)
WBC NRBC COR # BLD: 7.81 10*3/MM3 (ref 4.5–10.7)

## 2017-02-09 PROCEDURE — 85007 BL SMEAR W/DIFF WBC COUNT: CPT | Performed by: INTERNAL MEDICINE

## 2017-02-09 PROCEDURE — 99195 PHLEBOTOMY: CPT

## 2017-02-09 PROCEDURE — 85025 COMPLETE CBC W/AUTO DIFF WBC: CPT | Performed by: INTERNAL MEDICINE

## 2017-02-09 PROCEDURE — 36415 COLL VENOUS BLD VENIPUNCTURE: CPT

## 2017-02-09 PROCEDURE — 99214 OFFICE O/P EST MOD 30 MIN: CPT | Performed by: INTERNAL MEDICINE

## 2017-02-09 RX ORDER — SODIUM CHLORIDE 9 MG/ML
250 INJECTION, SOLUTION INTRAVENOUS ONCE
Status: DISCONTINUED | OUTPATIENT
Start: 2017-02-09 | End: 2017-02-09 | Stop reason: HOSPADM

## 2017-02-09 RX ORDER — SODIUM CHLORIDE 9 MG/ML
250 INJECTION, SOLUTION INTRAVENOUS ONCE
Status: CANCELLED | OUTPATIENT
Start: 2017-02-09

## 2017-02-09 NOTE — PROGRESS NOTES
REASONS FOR FOLLOWUP: Polycythemia vera, JODI 2 positive, erythropoietin level is low 1.0.  On oral hydroxyurea.     HISTORY OF PRESENT ILLNESS: The patient is a 61 y.o. year old male who is here for follow-up with the above-mentioned history.     Patient reports he could not tolerate the morning time hydroxyurea.  He was able to tolerate the nighttime hydroxyurea very well.  Most time he takes hydroxyurea 500 mg in the night.  he was told that he could take extra hydroxyurea, however he complains not able to concentrate and focus after taking the morning hydroxyurea, cannot perform job duty.  He also complains some fatigue, however not significant.  His did not have phlebotomy last week, last phlebotomy was 2 weeks ago.  He does report actually feeling better after the phlebotomy.  Patient otherwise has no specific complaints.  He has good performance status is ECOG 0-1.    No itching, no headache or weakness.      Medical History         Past Medical History   Diagnosis Date   • Arthritis     • ED (erectile dysfunction)     • Hypertension     • Pyloric stenosis     • Venereal disease         History of significant cigarette smoking.   Mild splenomegaly on CT scan 12/30/2016     HEMATOLOGIC  HISTORY:   patient is a 61-year-old gentleman who has been followed by Dr. Devon rocha for the last 4-5 years and states that he has had elevated hematocrit for quite a while. Several years ago he was found to have high hematocrit and more recently when he went to Dr. Caballero's office his hematocrit had gone to 59. He does smoke 2 packs per day. He had started at age 18 and quit at age 39s mid-forties but because of her recent divorce he recently started back on smoking since last year. He also drinks heavily and he has had heavy alcohol use for quite some time. He says he takes 2-3 beers a day along with 2-3 cocktails daily. He states that he has got chronic epigastric and right upper quadrant pain that he is concerned about as  "it doesn't seem to have improved it has gotten a little worse lately in the with pain in the right upper quadrant and epigastrium. He has not lost weight.      He stays a month ago he fell and got hurt by the [place] on his back and his left shoulder. So he has a lot of pain in the shoulder as well as his back. He did go to the emergency room the day after at which time x-rays have been done and all of the x-rays were negative for any fractures.      He states he does not have any headaches, he does have significant snoring and daytime fatigue and sleepiness but has not been so far evaluated for sleep apnea.     JAK2 positive, MPL, CALR, BCR/CABL negative. Flow negative. Erythropoietin level 1.0.   CT c/abd/pelvis negative.  Hydrea started 17      MEDICATIONS: The current medication list was reviewed with the patient and updated in the EMR this date per the Medical Assistant. Medication dosages and frequencies were confirmed to be accurate.       ALLERGIES:  No Known Allergies      Social history he is been  for the fourth time. He is a self-employed . He smokes 2 packs per day, is to smoke many years until age 39 and now more recently he restarted back a 1 year ago because of the recent divorce. He drinks heavily as well.       Family history his mother had breast cancer at age 60 but she is alive at age 84. His father  at 84 with car accident. He has 2 brothers and 2 sisters that are all in good health. He has got 4 kids [phone] and 23 [place] who are all in good health.       Review of Systems  A comprehensive 14 point review of systems was performed and was negative except as mentioned.     Objective         Vitals:    17 1000   BP: 145/94   Pulse: 74   Resp: 12   Temp: 97.5 °F (36.4 °C)   SpO2: 100%   Weight: 192 lb (87.1 kg)   Height: 70.08\" (178 cm)   PainSc: 0-No pain   ECOG 0          Physical Exam   GENERAL: Well-developed, well-nourished in no acute distress.   SKIN: Warm, " dry without rashes, purpura or petechiae.  EYES: Pupils equal, round and reactive to light. EOMs intact. Conjunctivae normal.  EARS: Hearing intact.  NOSE:  No excoriations or nasal discharge.  MOUTH: Tongue is well-papillated; no stomatitis or ulcers. Lips normal.  THROAT: Oropharynx without lesions or exudates.  NECK: Supple with good range of motion; no thyromegaly or masses.  LYMPHATICS: No cervical, supraclavicular adenopathy.  CHEST: Lungs clear to auscultation. Good airflow.  CARDIAC: Regular rate and rhythm without murmurs, rubs or gallops. Normal S1,S2.  ABDOMEN: Soft, nontender with no hepatosplenomegaly or masses.  EXTREMITIES: No clubbing, cyanosis or edema.  NEUROLOGICAL: Cranial Nerves II-XII grossly intact. No focal neurological deficits.   PSYCHIATRIC: Normal affect and mood.         RECENT LABS:    Lab Results   Component Value Date    WBC 7.81 02/09/2017    HGB 17.1 02/09/2017    HCT 49.3 02/09/2017    MCV 92.5 02/09/2017     02/09/2017     Lab Results   Component Value Date    NEUTROABS 5.16 02/09/2017        Assessment/Plan        1. Polycythemia vera, JODI 2 positive.  Patient has phlebotomy 4 times, tolerated well.  He also takes hydroxyurea, which he tolerates the night dose every day, however in the daytime he tried 45 different times, and each time he could not focus and to his job after the morning dose.  I instructed patient to take 2 tablets in the night together to see if he can tolerates.  If he cannot, with the same problem, we may have to decrease his dose to 750 mg.  So far it seems he is responding to the hydroxyurea, with improved platelet counts, and also improved hemoglobin and hematocrit level.  Nevertheless his hematocrit is still high, I propose have phlebotomy today and bring him back in 1 week for tolerance check.  Patient voiced understanding.      He will continue low-dose aspirin.         Plan:     1.  Phlebotomy today 500 mL.    2.  Take hydroxyurea 1000 mg in the  night every day.     3.  He will return in 1 week with CBC check and M.D. visit.      25 minutes, over half of that time counseling.       ANABELA SEPULVEDA M.D., Ph.D.     2/09/2017         Cc: Devon Caballero MD

## 2017-02-14 ENCOUNTER — DOCUMENTATION (OUTPATIENT)
Dept: ONCOLOGY | Facility: CLINIC | Age: 62
End: 2017-02-14

## 2017-02-14 NOTE — PROGRESS NOTES
"[facility] initiated a call to the pt to see how he fared at his mental health appointment (intake) on 2/7/17. Pt was open and honest; he provided an assessment of his session with [facility]. He stated that he has not yet made a return appointment. \"I know what I need to do. When I'm ready,  I will do it. I compartmentalize very well.\" Pt was upbeat and stated that he feels optimistic about the future. He is drawing Social Security and will be able to save for the future, once he soon completes his bankruptcy payments. Pt completed surgery on his elbow last week and is doing well. Pt agreed to contact [facility] if needs arise.    Amaya Welch, [facility]  Oncology Social Worker  "

## 2017-02-15 DIAGNOSIS — M75.42 SHOULDER IMPINGEMENT, LEFT: Primary | ICD-10-CM

## 2017-02-15 DIAGNOSIS — D45 POLYCYTHEMIA VERA (HCC): Primary | ICD-10-CM

## 2017-02-17 ENCOUNTER — OFFICE VISIT (OUTPATIENT)
Dept: ONCOLOGY | Facility: CLINIC | Age: 62
End: 2017-02-17

## 2017-02-17 ENCOUNTER — INFUSION (OUTPATIENT)
Dept: ONCOLOGY | Facility: HOSPITAL | Age: 62
End: 2017-02-17

## 2017-02-17 ENCOUNTER — LAB (OUTPATIENT)
Dept: ONCOLOGY | Facility: HOSPITAL | Age: 62
End: 2017-02-17

## 2017-02-17 VITALS
TEMPERATURE: 97.9 F | BODY MASS INDEX: 27.53 KG/M2 | HEIGHT: 70 IN | WEIGHT: 192.3 LBS | OXYGEN SATURATION: 97 % | RESPIRATION RATE: 16 BRPM | DIASTOLIC BLOOD PRESSURE: 76 MMHG | HEART RATE: 83 BPM | SYSTOLIC BLOOD PRESSURE: 118 MMHG

## 2017-02-17 DIAGNOSIS — D45 POLYCYTHEMIA VERA (HCC): Primary | ICD-10-CM

## 2017-02-17 DIAGNOSIS — D45 POLYCYTHEMIA VERA (HCC): ICD-10-CM

## 2017-02-17 LAB
BASOPHILS # BLD AUTO: 0.14 10*3/MM3 (ref 0–0.2)
BASOPHILS NFR BLD AUTO: 1.5 % (ref 0–1.5)
DEPRECATED RDW RBC AUTO: 53.5 FL (ref 37–54)
EOSINOPHIL # BLD AUTO: 0.28 10*3/MM3 (ref 0–0.7)
EOSINOPHIL NFR BLD AUTO: 3 % (ref 0.3–6.2)
ERYTHROCYTE [DISTWIDTH] IN BLOOD BY AUTOMATED COUNT: 16.1 % (ref 11.5–14.5)
HCT VFR BLD AUTO: 49.1 % (ref 40.4–52.2)
HGB BLD-MCNC: 16.9 G/DL (ref 13.7–17.6)
IMM GRANULOCYTES # BLD: 0.14 10*3/MM3 (ref 0–0.03)
IMM GRANULOCYTES NFR BLD: 1.5 % (ref 0–0.5)
LYMPHOCYTES # BLD AUTO: 1.78 10*3/MM3 (ref 0.9–4.8)
LYMPHOCYTES NFR BLD AUTO: 18.9 % (ref 19.6–45.3)
MCH RBC QN AUTO: 31.8 PG (ref 27–32.7)
MCHC RBC AUTO-ENTMCNC: 34.4 G/DL (ref 32.6–36.4)
MCV RBC AUTO: 92.3 FL (ref 79.8–96.2)
MONOCYTES # BLD AUTO: 0.67 10*3/MM3 (ref 0.2–1.2)
MONOCYTES NFR BLD AUTO: 7.1 % (ref 5–12)
NEUTROPHILS # BLD AUTO: 6.4 10*3/MM3 (ref 1.9–8.1)
NEUTROPHILS NFR BLD AUTO: 68 % (ref 42.7–76)
NRBC BLD MANUAL-RTO: 0 /100 WBC (ref 0–0)
PLATELET # BLD AUTO: 405 10*3/MM3 (ref 140–500)
PMV BLD AUTO: 10.8 FL (ref 6–12)
RBC # BLD AUTO: 5.32 10*6/MM3 (ref 4.6–6)
WBC NRBC COR # BLD: 9.41 10*3/MM3 (ref 4.5–10.7)

## 2017-02-17 PROCEDURE — 99214 OFFICE O/P EST MOD 30 MIN: CPT | Performed by: INTERNAL MEDICINE

## 2017-02-17 PROCEDURE — 36415 COLL VENOUS BLD VENIPUNCTURE: CPT

## 2017-02-17 PROCEDURE — 85025 COMPLETE CBC W/AUTO DIFF WBC: CPT | Performed by: INTERNAL MEDICINE

## 2017-02-17 PROCEDURE — 99195 PHLEBOTOMY: CPT

## 2017-02-17 NOTE — PROGRESS NOTES
REASONS FOR FOLLOWUP: Polycythemia vera, JODI 2 positive, erythropoietin level is low 1.0. On oral hydroxyurea.      HISTORY OF PRESENT ILLNESS: The patient is a 61 y.o. year old male who is here for follow-up with the above-mentioned history.     Patient was here last week, I adjusted his hydroxyurea dosing schedule.  He now takes 2 tablets 1000 mg in the night, instead of previous twice the.  He reports much better tolerance, and able to focus his job in the daytime.  He also had a phlebotomy last week on February 9.  He reported feeling better afterwards.  No dizziness syncope.  Patient otherwise has no specific complaints.   No itching, no headache or weakness.        Medical History            Past Medical History   Diagnosis Date   • Arthritis      • ED (erectile dysfunction)      • Hypertension      • Pyloric stenosis      • Venereal disease           History of significant cigarette smoking.   Mild splenomegaly on CT scan 12/30/2016      HEMATOLOGIC HISTORY:   patient is a 61-year-old gentleman who has been followed by Dr. Devon rocha for the last 4-5 years and states that he has had elevated hematocrit for quite a while. Several years ago he was found to have high hematocrit and more recently when he went to Dr. Caballero's office his hematocrit had gone to 59. He does smoke 2 packs per day. He had started at age 18 and quit at age 39s mid-forties but because of her recent divorce he recently started back on smoking since last year. He also drinks heavily and he has had heavy alcohol use for quite some time. He says he takes 2-3 beers a day along with 2-3 cocktails daily. He states that he has got chronic epigastric and right upper quadrant pain that he is concerned about as it doesn't seem to have improved it has gotten a little worse lately in the with pain in the right upper quadrant and epigastrium. He has not lost weight.      He stays a month ago he fell and got hurt by the [place] on his back and his left  "shoulder. So he has a lot of pain in the shoulder as well as his back. He did go to the emergency room the day after at which time x-rays have been done and all of the x-rays were negative for any fractures.      He states he does not have any headaches, he does have significant snoring and daytime fatigue and sleepiness but has not been so far evaluated for sleep apnea.     JAK2 positive, MPL, CALR, BCR/CABL negative. Flow negative. Erythropoietin level 1.0.   CT c/abd/pelvis negative.  Hydrea 500 mg twice a day started 17     Hydroxyurea was switched to 1000 mg daily at bedtime on 2017.  Patient reports he tolerates better, and able to focus on his job during daytime.  Patient has been continued on phlebotomy program at the meantime.         MEDICATIONS: The current medication list was reviewed with the patient and updated in the EMR this date per the Medical Assistant. Medication dosages and frequencies were confirmed to be accurate.        ALLERGIES:  No Known Allergies       Social history he is been  for the fourth time. He is a self-employed . He smokes 2 packs per day, is to smoke many years until age 39 and now more recently he restarted back a 1 year ago because of the recent divorce. He drinks heavily as well.       Family history his mother had breast cancer at age 60 but she is alive at age 84. His father  at 84 with car accident. He has 2 brothers and 2 sisters that are all in good health. He has got 4 kids [id number] and 23 [place] who are all in good health.       Review of Systems  A comprehensive 14 point review of systems was performed and was negative except as mentioned.      Objective           Vitals:    17 0925   BP: 118/76   Pulse: 83   Resp: 16   Temp: 97.9 °F (36.6 °C)   SpO2: 97%   Weight: 192 lb 4.8 oz (87.2 kg)   Height: 70.08\" (178 cm)   PainSc: 0-No pain   ECOG 0           Physical Exam   GENERAL: Well-developed, well-nourished in no acute distress. "   SKIN: Warm, dry without rashes, purpura or petechiae.  EYES: Pupils equal, round and reactive to light. EOMs intact. Conjunctivae normal.  EARS: Hearing intact.  NOSE: No excoriations or nasal discharge.  MOUTH: Tongue is well-papillated; no stomatitis or ulcers. Lips normal.  THROAT: Oropharynx without lesions or exudates.  NECK: Supple with good range of motion; no thyromegaly or masses.  LYMPHATICS: No cervical, supraclavicular adenopathy.  CHEST: Lungs clear to auscultation. Good airflow.  CARDIAC: Regular rate and rhythm without murmurs, rubs or gallops. Normal S1,S2.  ABDOMEN: Soft, nontender with no hepatosplenomegaly or masses.  EXTREMITIES: No clubbing, cyanosis or edema.  NEUROLOGICAL: Cranial Nerves II-XII grossly intact. No focal neurological deficits.   PSYCHIATRIC: Normal affect and mood.           RECENT LABS:  Lab Results   Component Value Date    WBC 9.41 02/17/2017    HGB 16.9 02/17/2017    HCT 49.1 02/17/2017    MCV 92.3 02/17/2017     02/17/2017     Lab Results   Component Value Date    NEUTROABS 6.40 02/17/2017          Assessment/Plan          1. Polycythemia vera, JODI 2 positive. Patient has phlebotomy several times, tolerated well.  The most recent phlebotomy was a week ago on 12/09/2017, However his Hb/Hct barely improved.  He will need another phlebotomy today.  His WBC and a platelet counts are in the reasonable territory.  Hopefully persistent dosing of hydroxyurea at thousand milligrams will also help to decrease his WBC and platelets.  I explained to the patient about the JAK2 positive myeloproliferative disorder.  he voiced understanding.    Patient reports he tolerates much better now with hydroxyurea 1000 mg daily at bedtime, able to concentrate on his job in the daytime.     Because of high risk of thrombosis, He will continue low-dose aspirin.          Plan:      1.  Phlebotomy today 500 mL.     2. Continue hydroxyurea 1000 mg in the night every day.     3.  Monitor CBC  monthly, phlebotomy if hematocrit more than 47%.    4.  Patient will return for M.D. visit in 3 months, we'll repeat labs.         25 minutes, over half of that time counseling.      ANABELA SEPULVEDA M.D., Ph.D.    2/09/2017           Cc: Devon Caballero MD

## 2017-03-14 DIAGNOSIS — D45 POLYCYTHEMIA VERA (HCC): Primary | ICD-10-CM

## 2017-03-16 ENCOUNTER — APPOINTMENT (OUTPATIENT)
Dept: ONCOLOGY | Facility: HOSPITAL | Age: 62
End: 2017-03-16

## 2017-03-16 ENCOUNTER — LAB (OUTPATIENT)
Dept: OTHER | Facility: HOSPITAL | Age: 62
End: 2017-03-16

## 2017-03-16 ENCOUNTER — APPOINTMENT (OUTPATIENT)
Dept: OTHER | Facility: HOSPITAL | Age: 62
End: 2017-03-16

## 2017-03-16 ENCOUNTER — INFUSION (OUTPATIENT)
Dept: ONCOLOGY | Facility: HOSPITAL | Age: 62
End: 2017-03-16

## 2017-03-16 VITALS
BODY MASS INDEX: 27.26 KG/M2 | DIASTOLIC BLOOD PRESSURE: 81 MMHG | HEART RATE: 71 BPM | TEMPERATURE: 98.4 F | WEIGHT: 190.4 LBS | SYSTOLIC BLOOD PRESSURE: 142 MMHG

## 2017-03-16 DIAGNOSIS — D45 POLYCYTHEMIA VERA (HCC): ICD-10-CM

## 2017-03-16 DIAGNOSIS — D45 POLYCYTHEMIA VERA (HCC): Primary | ICD-10-CM

## 2017-03-16 LAB
BASOPHILS # BLD AUTO: 0.11 10*3/MM3 (ref 0–0.2)
BASOPHILS NFR BLD AUTO: 1.3 % (ref 0–1.5)
DEPRECATED RDW RBC AUTO: 53 FL (ref 37–54)
EOSINOPHIL # BLD AUTO: 0.13 10*3/MM3 (ref 0–0.7)
EOSINOPHIL NFR BLD AUTO: 1.5 % (ref 0.3–6.2)
ERYTHROCYTE [DISTWIDTH] IN BLOOD BY AUTOMATED COUNT: 15.9 % (ref 11.5–14.5)
HCT VFR BLD AUTO: 49.9 % (ref 40.4–52.2)
HGB BLD-MCNC: 16.9 G/DL (ref 13.7–17.6)
IMM GRANULOCYTES # BLD: 0.11 10*3/MM3 (ref 0–0.03)
IMM GRANULOCYTES NFR BLD: 1.3 % (ref 0–0.5)
LYMPHOCYTES # BLD AUTO: 1.56 10*3/MM3 (ref 0.9–4.8)
LYMPHOCYTES NFR BLD AUTO: 18 % (ref 19.6–45.3)
MCH RBC QN AUTO: 31.5 PG (ref 27–32.7)
MCHC RBC AUTO-ENTMCNC: 33.9 G/DL (ref 32.6–36.4)
MCV RBC AUTO: 93.1 FL (ref 79.8–96.2)
MONOCYTES # BLD AUTO: 0.64 10*3/MM3 (ref 0.2–1.2)
MONOCYTES NFR BLD AUTO: 7.4 % (ref 5–12)
NEUTROPHILS # BLD AUTO: 6.1 10*3/MM3 (ref 1.9–8.1)
NEUTROPHILS NFR BLD AUTO: 70.5 % (ref 42.7–76)
NRBC BLD MANUAL-RTO: 0 /100 WBC (ref 0–0)
PLATELET # BLD AUTO: 267 10*3/MM3 (ref 140–500)
PMV BLD AUTO: 9.9 FL (ref 6–12)
RBC # BLD AUTO: 5.36 10*6/MM3 (ref 4.6–6)
WBC NRBC COR # BLD: 8.65 10*3/MM3 (ref 4.5–10.7)

## 2017-03-16 PROCEDURE — 99195 PHLEBOTOMY: CPT | Performed by: INTERNAL MEDICINE

## 2017-03-16 PROCEDURE — 85025 COMPLETE CBC W/AUTO DIFF WBC: CPT | Performed by: INTERNAL MEDICINE

## 2017-03-16 PROCEDURE — 36415 COLL VENOUS BLD VENIPUNCTURE: CPT

## 2017-04-13 ENCOUNTER — LAB (OUTPATIENT)
Dept: OTHER | Facility: HOSPITAL | Age: 62
End: 2017-04-13

## 2017-04-13 ENCOUNTER — INFUSION (OUTPATIENT)
Dept: ONCOLOGY | Facility: HOSPITAL | Age: 62
End: 2017-04-13

## 2017-04-13 VITALS
WEIGHT: 192 LBS | HEART RATE: 71 BPM | DIASTOLIC BLOOD PRESSURE: 78 MMHG | SYSTOLIC BLOOD PRESSURE: 116 MMHG | TEMPERATURE: 97.9 F | BODY MASS INDEX: 27.49 KG/M2

## 2017-04-13 DIAGNOSIS — D45 POLYCYTHEMIA VERA (HCC): ICD-10-CM

## 2017-04-13 DIAGNOSIS — D45 POLYCYTHEMIA VERA (HCC): Primary | ICD-10-CM

## 2017-04-13 LAB
BASOPHILS # BLD AUTO: 0.04 10*3/MM3 (ref 0–0.2)
BASOPHILS NFR BLD AUTO: 0.7 % (ref 0–1.5)
DEPRECATED RDW RBC AUTO: 57.6 FL (ref 37–54)
EOSINOPHIL # BLD AUTO: 0.11 10*3/MM3 (ref 0–0.7)
EOSINOPHIL NFR BLD AUTO: 2 % (ref 0.3–6.2)
ERYTHROCYTE [DISTWIDTH] IN BLOOD BY AUTOMATED COUNT: 16.8 % (ref 11.5–14.5)
HCT VFR BLD AUTO: 47.6 % (ref 40.4–52.2)
HGB BLD-MCNC: 16.4 G/DL (ref 13.7–17.6)
IMM GRANULOCYTES # BLD: 0.1 10*3/MM3 (ref 0–0.03)
IMM GRANULOCYTES NFR BLD: 1.8 % (ref 0–0.5)
LYMPHOCYTES # BLD AUTO: 1.43 10*3/MM3 (ref 0.9–4.8)
LYMPHOCYTES NFR BLD AUTO: 26 % (ref 19.6–45.3)
MCH RBC QN AUTO: 32.7 PG (ref 27–32.7)
MCHC RBC AUTO-ENTMCNC: 34.5 G/DL (ref 32.6–36.4)
MCV RBC AUTO: 94.8 FL (ref 79.8–96.2)
MONOCYTES # BLD AUTO: 0.37 10*3/MM3 (ref 0.2–1.2)
MONOCYTES NFR BLD AUTO: 6.7 % (ref 5–12)
NEUTROPHILS # BLD AUTO: 3.45 10*3/MM3 (ref 1.9–8.1)
NEUTROPHILS NFR BLD AUTO: 62.8 % (ref 42.7–76)
NRBC BLD MANUAL-RTO: 0 /100 WBC (ref 0–0)
PLATELET # BLD AUTO: 195 10*3/MM3 (ref 140–500)
PMV BLD AUTO: 10.5 FL (ref 6–12)
RBC # BLD AUTO: 5.02 10*6/MM3 (ref 4.6–6)
WBC NRBC COR # BLD: 5.5 10*3/MM3 (ref 4.5–10.7)

## 2017-04-13 PROCEDURE — 85025 COMPLETE CBC W/AUTO DIFF WBC: CPT

## 2017-04-13 PROCEDURE — 36415 COLL VENOUS BLD VENIPUNCTURE: CPT

## 2017-04-13 PROCEDURE — 99195 PHLEBOTOMY: CPT | Performed by: INTERNAL MEDICINE

## 2017-08-04 RX ORDER — LISINOPRIL 40 MG/1
TABLET ORAL
Qty: 30 TABLET | Refills: 0 | Status: SHIPPED | OUTPATIENT
Start: 2017-08-04 | End: 2017-09-03 | Stop reason: SDUPTHER

## 2017-08-25 RX ORDER — HYDROXYUREA 500 MG/1
CAPSULE ORAL
Qty: 60 CAPSULE | Refills: 4 | Status: SHIPPED | OUTPATIENT
Start: 2017-08-25 | End: 2018-02-22 | Stop reason: SDUPTHER

## 2017-09-05 RX ORDER — LISINOPRIL 40 MG/1
TABLET ORAL
Qty: 30 TABLET | Refills: 0 | Status: SHIPPED | OUTPATIENT
Start: 2017-09-05

## 2018-02-22 RX ORDER — HYDROXYUREA 500 MG/1
CAPSULE ORAL
Qty: 60 CAPSULE | Refills: 3 | Status: SHIPPED | OUTPATIENT
Start: 2018-02-22

## 2018-06-25 RX ORDER — HYDROXYUREA 500 MG/1
CAPSULE ORAL
Qty: 60 CAPSULE | Refills: 3 | OUTPATIENT
Start: 2018-06-25

## 2018-06-25 RX ORDER — HYDROXYUREA 500 MG/1
CAPSULE ORAL
Qty: 56 CAPSULE | Refills: 2 | OUTPATIENT
Start: 2018-06-25

## 2018-06-25 NOTE — TELEPHONE ENCOUNTER
Pt has cancelled all appts with our office because we are out of network.  Hydrea not refilled by us at this time